# Patient Record
Sex: MALE | Race: BLACK OR AFRICAN AMERICAN | NOT HISPANIC OR LATINO | Employment: UNEMPLOYED | URBAN - METROPOLITAN AREA
[De-identification: names, ages, dates, MRNs, and addresses within clinical notes are randomized per-mention and may not be internally consistent; named-entity substitution may affect disease eponyms.]

---

## 2020-02-03 ENCOUNTER — TELEPHONE (OUTPATIENT)
Dept: UROLOGY | Facility: CLINIC | Age: 25
End: 2020-02-03

## 2020-02-03 NOTE — TELEPHONE ENCOUNTER
Patient arrived for his appt showing flu-like symptoms. He was questioned upon check in regarding symptoms and answered yes to all of them. Pt stated he was seen in the ER recently. Records indicated he was dx with Strep/pharyngitis. Today he was showing signs of nausea/vomiting and complained of SOB so he was advised to go to ER to be evaluated and cleared of flu/strep before he could be seen in our clinic. Patient verbalized understanding and left.

## 2020-02-05 ENCOUNTER — NURSE TRIAGE (OUTPATIENT)
Dept: ADMINISTRATIVE | Facility: CLINIC | Age: 25
End: 2020-02-05

## 2020-02-06 NOTE — TELEPHONE ENCOUNTER
Mother calling at first, started to ask questions about insurance. I advised that we are a Triage line and are not equipped to handle insurance issues at this time. She then passed the phone to her son who was not aware of who I was and seemed confused that I was calling. Reports he needs a PCP and that Alfredito Ellis isn't his PCP. Advised he call appointment desk in the morning to get set up with new PCP. He verbalized understanding and stated that was his original plan before his mother called.    Reason for Disposition   General information question, no triage required and triager able to answer question    Protocols used: INFORMATION ONLY CALL-A-

## 2020-02-13 ENCOUNTER — NURSE TRIAGE (OUTPATIENT)
Dept: ADMINISTRATIVE | Facility: CLINIC | Age: 25
End: 2020-02-13

## 2020-02-13 NOTE — TELEPHONE ENCOUNTER
"Pt's mother calling, but pt is in the background being loud, interruptive, and encouraging his mother to lie to me about their current location. The pt's history is very confusing. They are calling to get set up with an ENT, but mother states pt is exhibiting symptoms of peritonsillar abscess that he as dx with before and was put on abx. Pt in the background of call also saying that he can't breathe. At this point, I advised that they go to nearest ED or dial 911. They stated that they were currently in the ED, but moments before the caller had explained that they were sitting in a car in [place]. When I questioned the caller about this, she stated that they were just at [facility] in [place] where the pt was evaluated for all of his current symptoms and discharged after seeing an ENT. They are unhappy with his dx and care plan, so they want an ENT with [facility] for the pt that accepts Medicaid. I explained that in order for him to get an ENT appointment, he would have to have a referral from PCP or ED. Pt then stated that he has a referral. At this point, I advised that if he had truly just been evaluated in the ED for his symptoms, then I could advice [facility] or [facility] ED in order to be reevaluated. Pt could be heard in the background saying "[facility] [place]," and then pt asked whether or not [facility] were connected to [facility], because if so he didn't want to go. I answered questions appropriately.    Reason for Disposition   General information question, no triage required and triager able to answer question    Protocols used: INFORMATION ONLY CALL-A-AH      "

## 2020-02-14 ENCOUNTER — HOSPITAL ENCOUNTER (EMERGENCY)
Facility: HOSPITAL | Age: 25
Discharge: HOME | End: 2020-02-14
Attending: EMERGENCY MEDICINE
Payer: MEDICAID

## 2020-02-14 ENCOUNTER — HOSPITAL ENCOUNTER (EMERGENCY)
Facility: OTHER | Age: 25
Discharge: HOME | End: 2020-02-14
Attending: EMERGENCY MEDICINE
Payer: MEDICAID

## 2020-02-14 VITALS
DIASTOLIC BLOOD PRESSURE: 67 MMHG | HEART RATE: 80 BPM | BODY MASS INDEX: 37.51 KG/M2 | TEMPERATURE: 98 F | OXYGEN SATURATION: 98 % | WEIGHT: 308 LBS | HEIGHT: 76 IN | SYSTOLIC BLOOD PRESSURE: 142 MMHG | RESPIRATION RATE: 18 BRPM

## 2020-02-14 VITALS
SYSTOLIC BLOOD PRESSURE: 125 MMHG | TEMPERATURE: 98 F | HEART RATE: 82 BPM | RESPIRATION RATE: 17 BRPM | DIASTOLIC BLOOD PRESSURE: 65 MMHG | HEIGHT: 76 IN | WEIGHT: 313 LBS | BODY MASS INDEX: 38.11 KG/M2 | OXYGEN SATURATION: 98 %

## 2020-02-14 DIAGNOSIS — J02.9 PHARYNGITIS, UNSPECIFIED ETIOLOGY: Primary | ICD-10-CM

## 2020-02-14 DIAGNOSIS — J36 PERITONSILLAR ABSCESS: Primary | ICD-10-CM

## 2020-02-14 PROCEDURE — 99284 EMERGENCY DEPT VISIT MOD MDM: CPT

## 2020-02-14 PROCEDURE — 42440 EXCISE SUBMAXILLARY GLAND: CPT | Mod: ,,, | Performed by: OTOLARYNGOLOGY

## 2020-02-14 PROCEDURE — 99281 EMR DPT VST MAYX REQ PHY/QHP: CPT | Mod: 27

## 2020-02-14 PROCEDURE — 99284 EMERGENCY DEPT VISIT MOD MDM: CPT | Mod: ,,, | Performed by: PHYSICIAN ASSISTANT

## 2020-02-14 PROCEDURE — 99284 PR EMERGENCY DEPT VISIT,LEVEL IV: ICD-10-PCS | Mod: ,,, | Performed by: PHYSICIAN ASSISTANT

## 2020-02-14 PROCEDURE — 42440 PR EXCISION SUBMAXILLARY GLAND: ICD-10-PCS | Mod: ,,, | Performed by: OTOLARYNGOLOGY

## 2020-02-14 RX ORDER — NAPROXEN 500 MG/1
500 TABLET ORAL 2 TIMES DAILY WITH MEALS
Qty: 30 TABLET | Refills: 0 | Status: SHIPPED | OUTPATIENT
Start: 2020-02-14

## 2020-02-14 NOTE — ED TRIAGE NOTES
"Pt reports SOB and symptoms of PTA (peritonsilar abcess). Pt reports "it is hard to talk" and swelling on the left side. Pt was seen at hospital in [place] yesterday.   "

## 2020-02-14 NOTE — ED PROVIDER NOTES
Encounter Date: 2/14/2020       History     Chief Complaint   Patient presents with    Sore Throat     sent from [facility] for peritonsillar abscess and ENT      24-year-old male with no known past medical history presents for sore throat. He states he has had symptoms for 1 month and has been treated with multiple different antibiotics.  He was discharged from [facility] this morning after a brief observation admission for peritonsillar abscess.  He states that he still feels fatigued with subjective fever, chills and headache also endorsing a rash on the back of his neck. His mother is also concerned because she states that he has been breathing heavily.  He denies shortness of breath, chest pain, drooling, voice change or dysphagia.  He is requesting to see an ENT doctor for a 2nd opinion.        Review of patient's allergies indicates:  No Known Allergies  History reviewed. No pertinent past medical history.  History reviewed. No pertinent surgical history.  History reviewed. No pertinent family history.  Social History     Tobacco Use    Smoking status: Never Smoker   Substance Use Topics    Alcohol use: Not Currently    Drug use: Not on file     Review of Systems   Constitutional: Positive for chills, fatigue and fever. Negative for diaphoresis.   HENT: Positive for sore throat. Negative for drooling, ear pain and trouble swallowing.    Respiratory: Negative for cough and shortness of breath.    Cardiovascular: Negative for chest pain.   Gastrointestinal: Negative for abdominal pain, nausea and vomiting.   Genitourinary: Negative for dysuria and hematuria.   Musculoskeletal: Negative for back pain, neck pain and neck stiffness.   Skin: Positive for rash.   Allergic/Immunologic: Negative for immunocompromised state.   Neurological: Positive for headaches. Negative for weakness.   Hematological: Does not bruise/bleed easily.       Physical Exam     Initial Vitals [02/14/20 0630]   BP Pulse  Resp Temp SpO2   (!) 142/67 80 18 98.3 °F (36.8 °C) 98 %      MAP       --         Physical Exam    Nursing note and vitals reviewed.  Constitutional: He appears well-developed and well-nourished. He is not diaphoretic. No distress.   HENT:   Head: Normocephalic and atraumatic.   Mouth/Throat: Uvula is midline and mucous membranes are normal. There is trismus in the jaw. Posterior oropharyngeal erythema present. No oropharyngeal exudate, posterior oropharyngeal edema or tonsillar abscesses.   Normal voice.  Minimal trismus.  Posterior pharynx is erythematous, no tonsillar abscess.  No intraoral lesions.   Eyes: EOM are normal. Pupils are equal, round, and reactive to light.   Neck: Normal range of motion. Neck supple. No JVD present.   Cardiovascular: Normal rate, regular rhythm, normal heart sounds and intact distal pulses. Exam reveals no gallop and no friction rub.    No murmur heard.  Pulmonary/Chest: Breath sounds normal. No stridor. No respiratory distress. He has no wheezes. He has no rhonchi. He has no rales. He exhibits no tenderness.   Musculoskeletal: Normal range of motion.   Lymphadenopathy:     He has no cervical adenopathy.   Neurological: He is alert and oriented to person, place, and time.   Skin: Skin is warm and dry. Rash noted.   Macular, hyperpigmented rash on the posterior neck.  No discharge or bleeding.   Psychiatric: His affect is blunt.         ED Course   Procedures  Labs Reviewed - No data to display       Imaging Results    None          Medical Decision Making:   History:   Old Medical Records: I decided to obtain old medical records.  Old Records Summarized: records from another hospital.       <> Summary of Records: Patient has been seen multiple times at other hospitals for the same complaint.  He was initially seen in [facility] and was told that he had a peritonsillar abscess.  Apparently he was supposed to follow up with ENT there but was dissatisfied with the care  therefore presented to [facility].  He was admitted for observation where he received IV antibiotics and was seen by ENT.  He had a CT scan of the neck which showed:    CT soft tissue neck 2/13/2020: Asymmetrical mass of the left base of tongue and oropharynx. This is effacing the left oral pharynx. Would recommend endoscopy. May represent underlying neoplasm.    CXR: Lung volumes are mildly diminished, and there is mild bibasilar atelectasis.    Patient states that he also had endoscopy to evaluate this asymmetrical mass.    He was also seen at [facility] this morning and left to come to [facility] given no ENT service at that facility.        Initial Assessment:   24-year-old male presenting requesting to see ENT for continued sore throat and malaise after recent PTA diagnosis.  He is mildly hypertensive otherwise normal vitals.  Normal voice, managing secretions, minimal trismus.    Differential Diagnosis:   Tonsillitis  I doubt continued PTA given negative imaging  Pharyngitis  Clinical Tests:   Lab Tests: Reviewed  Radiological Study: Reviewed  ED Management:  Will check labs, give fluids and consult ENT.    Patient refusing IV.  Patient seen and evaluated at bedside by ENT who do not see any drainable collection.  I offered the patient lab work to work up his malaise but he refuses.  I suspect that he has continued tonsillitis/pharyngitis for which he has already been prescribed clindamycin.  He also has hydrocortisone cream for the rash on his neck.  Will discharge with Magic mouthwash and naproxen for pain control and instruct patient to complete course of clindamycin.  Patient given ENT follow-up for potential tonsillectomy as well as Internal Medicine referral information per patient request. Stressed the importance of follow-up, strict ED return precautions given.  Patient voiced understanding and is comfortable with discharge. I discussed this patient with my supervising physician.    Other:   I  have discussed this case with another health care provider.       <> Summary of the Discussion: I discussed this patient with ENT.  They do not see any need for surgical intervention and recommend patient complete course of clindamycin.                                 Clinical Impression:       ICD-10-CM ICD-9-CM   1. Pharyngitis, unspecified etiology J02.9 462         Disposition:   Disposition: Discharged  Condition: Stable                     Cris Prince PA-C  02/14/20 0850

## 2020-02-14 NOTE — CONSULTS
Otolaryngology - Head and Neck Surgery  Consultation Report    Chief Complaint:  Sore throat    History of Present Illness: 24 y.o. year old male presents with 2-3 week h/o sore throat. Reports he was diagnosed with PTA 2 weeks ago, but received antibiotics and this resolved. He then reported to [facility] yesterday and received CT, which showed no abscess but he was placed on IV abx and steroids for significant tonsillitis. Discharged several hours prior to this visit and presented to [facility], where he was instructed to continue Clindamycin oral abx. He now presents for second opinion. Denies voice changes, SOB, drooling, he is tolerating PO and is currently afebrile.    Review of Systems reviewed including    CONSTITUTIONAL: no fevers, chills, weight loss, night sweats  EYES: no diplopia, no blurry vision  ENT: as above   NEURO: no motor or sensory deficits  CV: no CP, no palpitations  PULM: no cough, no SOB, no wheezing   GI: no abd pain, no constipation/diarrhea  : no dysuria, no hematuria  MSK: no bone/joint pain  HEM: no bruising or bleeding   PSYCH - no depression, no anxiety    Past Medical History: Patient has no past medical history on file.    Past Surgical History: Patient has no past surgical history on file.    Social History: Patient reports that he has never smoked. He does not have any smokeless tobacco history on file. He reports that he drank alcohol.    Family History: family history is not on file.    Medications: No current facility-administered medications for this encounter.     Current Outpatient Medications:     diphenhydrAMINE-aluminum-magnesium hydroxide-simethicone-lidocaine HCl 2%, Swish and spit 10 mLs every 4 (four) hours as needed (sore throat)., Disp: 100 mL, Rfl: 1    naproxen (NAPROSYN) 500 MG tablet, Take 1 tablet (500 mg total) by mouth 2 (two) times daily with meals., Disp: 30 tablet, Rfl: 0       Allergies: Patient has No Known Allergies.    Physical Exam:  Temp:  [98.3 °F (36.8  °C)-98.4 °F (36.9 °C)] 98.3 °F (36.8 °C)  Pulse:  [80-82] 80  Resp:  [17-18] 18  SpO2:  [98 %] 98 %  BP: (125-142)/(65-67) 142/67    Intake/Output  No intake or output data in the 24 hours ending 02/14/20 0846      General: NAD; Well appearing  Neuro: AAOx3. CN II-XII intact.  Respiratory: No labored breathing, no stridor  Voice:  Within normal limits  Head/Face: Normal inspection  Salivary glands WNL.  Eyes: EOMI; PERRLA   Right Ear: Auricle WNL. EAC WNL. TM normal.      Left Ear: Auricle WNL. EAC WNL. TM normal.  Nose: normal ext appearance and palpation; Negative sinus pressure/tenderness;. Normal septum, inferior turbinates, mucosa.   OC: Lips and gingiva wnl.  Good dentition. FOM Soft.  Anterior Tongue nml size and mobility; Hard Palate wnl  OP: BOT WNL. Soft palate wnl with Midline uvula.  Tonsils 2+ bilaterally erythematous and with small white plaques. Posterior oropharynx patent, wnl  Neck/Lymphatic: No LAD.   No thyromegaly. Full ROM.    LABORATORY  CBC  No results for input(s): WBC, HGB, HCT, MCV, PLT in the last 72 hours.  BMP  No results for input(s): GLU, NA, K, CL, CO2, BUN, CREATININE, CALCIUM, PHOS, MG, PREALBUMIN in the last 72 hours.  COAGS  No results for input(s): PROTIME, INR, PTT in the last 72 hours.    IMAGING  Imaging Results    None          Assessment: 24 y.o. year old male with resolving tonsillitis. No evidence of PTA.    Plan:   - Continue Clindamycin course until completion  - Stay well hydrated  - Follow-up in ENT clinic prn for tonsillectomy evaluation  - Defer disposition to ER, but OK for discharge from ENT perspective    Rocky Tomas MD  Otolaryngology PGY-IV

## 2020-02-14 NOTE — ED PROVIDER NOTES
"Encounter Date: 2/14/2020    SCRIBE #1 NOTE: I, Joseph Chiara, am scribing for, and in the presence of, Dr. Bishop.       History     Chief Complaint   Patient presents with    swollen lymphnodes     pt reports having tiffany and needing further treatment     Time seen by provider: 5:40 AM    This is a 24 y.o. male who presents to the ED stating he has "quinsy" and he needs surgery. The patient states he thought this was [facility] where he would be able to see an ENT specialist.    The history is provided by the patient.     Review of patient's allergies indicates:  No Known Allergies  No past medical history on file.  No past surgical history on file.  No family history on file.  Social History     Tobacco Use    Smoking status: Not on file   Substance Use Topics    Alcohol use: Not on file    Drug use: Not on file     Review of Systems   Constitutional: Negative for fever.   HENT: Positive for sore throat.    Respiratory: Negative for shortness of breath.    Cardiovascular: Negative for chest pain.   Gastrointestinal: Negative for abdominal pain.   Genitourinary: Negative for dysuria.   Musculoskeletal: Negative for back pain.   Skin: Negative for rash.   Hematological: Does not bruise/bleed easily.   All other systems reviewed and are negative.      Physical Exam     Initial Vitals [02/14/20 0531]   BP Pulse Resp Temp SpO2   125/65 82 17 98.4 °F (36.9 °C) 98 %      MAP       --         Physical Exam    Nursing note and vitals reviewed.  Constitutional: He appears well-developed and well-nourished. He is not diaphoretic. No distress.   HENT:   Head: Normocephalic and atraumatic.   Speaking clearly.  Controlling secretions.  No muffled voice.   Eyes: EOM are normal.   Neck: Normal range of motion.   Cardiovascular: Normal rate.   Pulmonary/Chest: Breath sounds normal. No respiratory distress.   Musculoskeletal: Normal range of motion.   Neurological: He is alert.   Skin: Skin is warm.         ED " Course   Procedures  Labs Reviewed - No data to display       Imaging Results    None          Medical Decision Making:   History:   Old Medical Records: I decided to obtain old medical records.  Initial Assessment:   24-year-old healthy male presents stating he has Chilo and needs to see ENT for surgery.  The patient thought he was at [facility] and wanted to see ENT when the clinic opened this morning.  Patient afebrile, hemodynamically stable and well-appearing.  He is in no respiratory distress speaking clearly and controlling his secretions.  ED Management:  I did review his medical records prior to my assessment and he has been seen in the emergency department on multiple occasions for the same complaint over the past 2 days as well as seen by ENT and admitted for observation.  He has had lab work as well as an chest x-ray for shortness of breath and was started on antibiotics.  The notes available for review to not mention that he needs surgery.  There is mention that he was unhappy with his care at the previous facility and requested another evaluation by ENT.  When explained to the patient and his mother that this was not [facility] and we do not have ENT available on site, they opted to leave and go to [facility].  Patient discharged in stable condition.            Scribe Attestation:   Scribe #1: I performed the above scribed service and the documentation accurately describes the services I performed. I attest to the accuracy of the note.    Attending Attestation:           Physician Attestation for Scribe:  Physician Attestation Statement for Scribe #1: I, Dr. Bishop, reviewed documentation, as scribed by Joseph Guadarrama in my presence, and it is both accurate and complete.                               Clinical Impression:     1. Peritonsillar abscess                                Kami Bishop MD  02/14/20 0537

## 2020-02-14 NOTE — DISCHARGE INSTRUCTIONS
"Please finish the course of clindamycin that you were prescribed.  Make sure to get plenty of rest and drink fluids.  I am prescribing a numbing medication for your sore throat well as a medicine for pain.    You may schedule an appointment with ENT as needed for possible removal of your tonsils.  I have also [facility] the [facility] if you would like to establish a primary care doctor here.  You can also go to [email or web address] again click "find a doctor.  If you have worsening trouble breathing, are drooling or are unable to swallow please return to the emergency department.  "

## 2020-02-14 NOTE — ED TRIAGE NOTES
Pt presents to ED thinking he was at main campus looking for ENT. Reports this is his 3rd ED visit in 2 days for the same issue. Pt speaking in full sentences, maintaining oral secretions, respirations E/UL, NAD. Reports they are going to leave to see ENT for a PTA

## 2021-04-24 ENCOUNTER — HISTORICAL (OUTPATIENT)
Dept: ADMINISTRATIVE | Facility: HOSPITAL | Age: 26
End: 2021-04-24